# Patient Record
Sex: MALE | Race: WHITE | Employment: FULL TIME | URBAN - METROPOLITAN AREA
[De-identification: names, ages, dates, MRNs, and addresses within clinical notes are randomized per-mention and may not be internally consistent; named-entity substitution may affect disease eponyms.]

---
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
## 2022-01-12 ENCOUNTER — OFFICE VISIT (OUTPATIENT)
Dept: URGENT CARE | Age: 22
End: 2022-01-12
Attending: EMERGENCY MEDICINE

## 2022-01-12 VITALS
TEMPERATURE: 98 F | SYSTOLIC BLOOD PRESSURE: 138 MMHG | DIASTOLIC BLOOD PRESSURE: 73 MMHG | WEIGHT: 200 LBS | RESPIRATION RATE: 18 BRPM | OXYGEN SATURATION: 99 % | HEART RATE: 90 BPM

## 2022-01-12 DIAGNOSIS — Z48.02 VISIT FOR SUTURE REMOVAL: ICD-10-CM

## 2022-01-12 DIAGNOSIS — S01.81XD LACERATION OF FOREHEAD WITHOUT COMPLICATION, SUBSEQUENT ENCOUNTER: Primary | ICD-10-CM

## 2022-01-12 PROCEDURE — 99202 OFFICE O/P NEW SF 15 MIN: CPT

## 2022-01-12 ASSESSMENT — ENCOUNTER SYMPTOMS
EYES NEGATIVE: 1
HEMATOLOGIC/LYMPHATIC NEGATIVE: 1
PSYCHIATRIC NEGATIVE: 1
ALLERGIC/IMMUNOLOGIC NEGATIVE: 1
CONSTITUTIONAL NEGATIVE: 1
GASTROINTESTINAL NEGATIVE: 1
ENDOCRINE NEGATIVE: 1
RESPIRATORY NEGATIVE: 1
NEUROLOGICAL NEGATIVE: 1

## 2022-01-12 ASSESSMENT — PAIN SCALES - GENERAL: PAINLEVEL: 0

## 2022-12-25 ENCOUNTER — HOSPITAL ENCOUNTER (EMERGENCY)
Age: 22
Discharge: HOME | End: 2022-12-25

## 2022-12-25 VITALS
WEIGHT: 180.23 LBS | TEMPERATURE: 97.7 F | SYSTOLIC BLOOD PRESSURE: 136 MMHG | RESPIRATION RATE: 14 BRPM | OXYGEN SATURATION: 99 % | HEIGHT: 71 IN | HEART RATE: 68 BPM | DIASTOLIC BLOOD PRESSURE: 79 MMHG | BODY MASS INDEX: 25.23 KG/M2

## 2022-12-25 DIAGNOSIS — L03.011 PARONYCHIA OF FINGER OF RIGHT HAND: Primary | ICD-10-CM

## 2022-12-25 PROCEDURE — 10061 I&D ABSCESS COMP/MULTIPLE: CPT

## 2022-12-25 PROCEDURE — 26010 DRAINAGE OF FINGER ABSCESS: CPT | Performed by: PHYSICIAN ASSISTANT

## 2022-12-25 PROCEDURE — 10002803 HB RX 637: Performed by: PHYSICIAN ASSISTANT

## 2022-12-25 PROCEDURE — 99283 EMERGENCY DEPT VISIT LOW MDM: CPT

## 2022-12-25 RX ORDER — AMOXICILLIN AND CLAVULANATE POTASSIUM 875; 125 MG/1; MG/1
1 TABLET, FILM COATED ORAL EVERY 12 HOURS
Qty: 20 TABLET | Refills: 0 | Status: SHIPPED | OUTPATIENT
Start: 2022-12-25 | End: 2023-01-04

## 2022-12-25 RX ORDER — AMOXICILLIN AND CLAVULANATE POTASSIUM 875; 125 MG/1; MG/1
1 TABLET, FILM COATED ORAL ONCE
Status: COMPLETED | OUTPATIENT
Start: 2022-12-25 | End: 2022-12-25

## 2022-12-25 RX ORDER — NAPROXEN 500 MG/1
500 TABLET ORAL 2 TIMES DAILY WITH MEALS
Qty: 14 TABLET | Refills: 0 | Status: SHIPPED | OUTPATIENT
Start: 2022-12-25 | End: 2023-01-01

## 2022-12-25 RX ADMIN — AMOXICILLIN AND CLAVULANATE POTASSIUM 1 TABLET: 875; 125 TABLET, FILM COATED ORAL at 18:07

## 2022-12-25 ASSESSMENT — ENCOUNTER SYMPTOMS
NEUROLOGICAL NEGATIVE: 1
ENDOCRINE NEGATIVE: 1
CONSTITUTIONAL NEGATIVE: 1
ALLERGIC/IMMUNOLOGIC NEGATIVE: 1
EYES NEGATIVE: 1
GASTROINTESTINAL NEGATIVE: 1
PSYCHIATRIC NEGATIVE: 1
HEMATOLOGIC/LYMPHATIC NEGATIVE: 1
RESPIRATORY NEGATIVE: 1

## 2022-12-25 ASSESSMENT — PAIN SCALES - GENERAL: PAINLEVEL_OUTOF10: 5

## 2022-12-25 ASSESSMENT — PAIN DESCRIPTION - PAIN TYPE: TYPE: ACUTE PAIN

## 2023-08-05 ENCOUNTER — HOSPITAL ENCOUNTER (EMERGENCY)
Age: 23
Discharge: HOME | End: 2023-08-05

## 2023-08-05 ENCOUNTER — APPOINTMENT (OUTPATIENT)
Dept: GENERAL RADIOLOGY | Age: 23
End: 2023-08-05

## 2023-08-05 VITALS
DIASTOLIC BLOOD PRESSURE: 74 MMHG | OXYGEN SATURATION: 98 % | HEART RATE: 53 BPM | HEIGHT: 70 IN | BODY MASS INDEX: 25.25 KG/M2 | RESPIRATION RATE: 18 BRPM | SYSTOLIC BLOOD PRESSURE: 119 MMHG | WEIGHT: 176.37 LBS | TEMPERATURE: 98.1 F

## 2023-08-05 DIAGNOSIS — M25.562 ACUTE PAIN OF LEFT KNEE: Primary | ICD-10-CM

## 2023-08-05 PROCEDURE — 99283 EMERGENCY DEPT VISIT LOW MDM: CPT

## 2023-08-05 PROCEDURE — 73562 X-RAY EXAM OF KNEE 3: CPT

## (undated) NOTE — MR AVS SNAPSHOT
[address]  [address] [facility] [phone]               Thank you for choosing us for your health care visit with Graham Fairly.   We are glad to serve you and happy to provide you with this summary o Proxy Access to your child’s MyChart go to [email or web address] and click on the   Sign Up Forms link in the Additional Information box on the right. MyChart Questions? Call [phone] for help.   MyChart is NOT to be used for urgent needs o Limiting fast food, take out food, and eating out at restaurants  o Preparing foods at home as a family  o Eating a diet rich in calcium  o Eating a high fiber diet    Help your children form healthy habits.   Healthy active children are more likely to be

## (undated) NOTE — MR AVS SNAPSHOT
[address]  [address] [facility]  [phone]               Thank you for choosing us for your health care visit with Claudell Bright. Ayub, MD.  We are glad to serve you and happy to provide you with this summary Health Goals discussed Today        Last Edited       Therapy Goals 1/4/2017  5:02 PM by Haylie FLORES     Goal Comments    1. Patient to consistently perform his HEP and it's progression to maintain his improved condition.  -MET    2. Patient to have r

## (undated) NOTE — Clinical Note
Date: 01/04/17   Dx: Acute pain of left knee (M25.562)          Authorized # of Visits:  7       Referring MD: Angela Rossi  Next MD visit: none scheduled    Medication Changes since last visit?: No    Subjective: Patient has no pain in the (L) knee and has ful Yasmine Roth has attended 6 physical therapy sessions from 12/08/16 to 01/04/17. He now presents with WNL of (L) knee AROM without pain. He has returned to his normal home, school, and recreational activities without discomfort or compensation.  He has been well

## (undated) NOTE — LETTER
[facility] [employer] of Child Health Examination       Student's Name  Bhupinder Sharif Birth Date [birth year].Clinical diagnosis (measles, mumps, hepatits B) is allowed when verified by physician & supported with lab confirmation. Attach copy of lab result.        *MEASLES ([place])  MO/DA/YR        * MUMPS MO/DA/YR       HEPATITIS B   MO/DA/YR        VARICELLA M Loss of function of one of paired organs? (eye/ear/kidney/testicle)   Yes   No      Birth Defects? Developmental delay? Yes   No    Yes   No  Hospitalizations? When? What for? Yes   No    Blood disorders? Hemophilia, Sickle Cell, Other? Explain. Lead Risk Questionnaire  Req'd for children 6 months thru 6 yrs enrolled in licensed or public school operated day care, ,  nursery school and/or  (blood test req’d if resides in [place] or high risk zip)   Questionnaire Administered: Yes protector for arrhythmia, pacemaker, prosthetic device, dental bridge, false teeth, athleticsupport/cup     None   MENTAL HEALTH/OTHER   Is there anything else the school should know about this student?   No  If you would like to discuss this student's heal

## (undated) NOTE — ED AVS SNAPSHOT
[facility] AND [facility] in [address] [address] Kend[address]jia    Phone:  [phone]    Fax:  [phone]           Byron Gallardo   MRN: [id number]    Department:  [facility] AND [facility] in [address] [place] [address]   Date of Visit:  1/1 physician may seek payment directly from you for amounts other than your deductible, co-payment, or co-insurance and for other services not covered under your health insurance plan.   Please contact your insurance company and physician's office to determine different from what your Primary Care doctor has instructed you - please continue to take your medications as instructed by your Primary Care doctor until you can check with your doctor. Please bring the medication list to your next doctor's appointment. Medicaid plans. To get signed up and covered, please call [phone] and ask to get set up for an insurance coverage that is in-network with Stewart Singh. [employer]     Sign up for [employer] access for your child.   [employer] access allows y

## (undated) NOTE — LETTER
[facility] [employer] of Child Health Examination       Student's Name  Jacque Mccurdy Birth Date Title                           Date    (If adding dates to the above immunization history section, put your initials by date(s) and sign here.)   ALTERNATIVE PROOF OF IMMUNITY   1 Review of patient's allergies indicates no known allergies. MEDICATION  (List all prescribed or taken on a regular basis.)    Current Outpatient Prescriptions:   •  Protein Does not apply Powder, by Does not apply route daily. , Disp: , Rfl:    Diagnosis of BP 99/58   Pulse 80   Temp 97.9 °F (36.6 °C) (Oral)   Ht 5' 10\" (1.778 m)   Wt 176 lb (79.8 kg)   BMI 25.25 kg/m²     DIABETES SCREENING  BMI>85% age/sex  {YES_NO:585::\"No\"} And any two of the following:  Family History {YES_NO:585::\"No\"}    Ethnic Mi Eyes {YES:829::\"Yes\"}     Screen result:   Genito-Urinary {YES:829::\"Yes\"}  LMP   Nose {YES:829::\"Yes\"}  Neurological {YES:829::\"Yes\"}    Throat {YES:829::\"Yes\"}  Musculoskeletal {YES:829::\"Yes\"}    Mouth/Dental {YES:829::\"Yes\"}  Spinal exami Signature                                                                                Date  8/14/2017   Address/Phone  [facility], [address], [place]  [address], [address]  [phone]   Rev 11/15

## (undated) NOTE — ED AVS SNAPSHOT
Skyla Peña   MRN: [id number]    Department:  [facility] Emergency Department   Date of Visit:  7/22/2019           Disclosure     Insurance plans vary and the physician(s) referred by the ER may not be covered by your plan.  Please contact yo CARE PHYSICIAN AT ONCE OR RETURN IMMEDIATELY TO THE EMERGENCY DEPARTMENT. If you have been prescribed any medication(s), please fill your prescription right away and begin taking the medication(s) as directed.   If you believe that any of the medications